# Patient Record
Sex: FEMALE | Race: WHITE | HISPANIC OR LATINO
[De-identification: names, ages, dates, MRNs, and addresses within clinical notes are randomized per-mention and may not be internally consistent; named-entity substitution may affect disease eponyms.]

---

## 2019-05-29 ENCOUNTER — ASOB RESULT (OUTPATIENT)
Age: 29
End: 2019-05-29

## 2019-05-29 ENCOUNTER — APPOINTMENT (OUTPATIENT)
Dept: ANTEPARTUM | Facility: CLINIC | Age: 29
End: 2019-05-29
Payer: SELF-PAY

## 2019-05-29 PROBLEM — Z00.00 ENCOUNTER FOR PREVENTIVE HEALTH EXAMINATION: Status: ACTIVE | Noted: 2019-05-29

## 2019-05-29 PROCEDURE — 76856 US EXAM PELVIC COMPLETE: CPT

## 2019-11-15 ENCOUNTER — ASOB RESULT (OUTPATIENT)
Age: 29
End: 2019-11-15

## 2019-11-15 ENCOUNTER — APPOINTMENT (OUTPATIENT)
Dept: ANTEPARTUM | Facility: CLINIC | Age: 29
End: 2019-11-15
Payer: MEDICAID

## 2019-11-15 PROCEDURE — 76811 OB US DETAILED SNGL FETUS: CPT

## 2019-11-15 PROCEDURE — 76817 TRANSVAGINAL US OBSTETRIC: CPT

## 2019-11-23 ENCOUNTER — APPOINTMENT (OUTPATIENT)
Dept: ANTEPARTUM | Facility: CLINIC | Age: 29
End: 2019-11-23

## 2019-11-23 ENCOUNTER — APPOINTMENT (OUTPATIENT)
Dept: MATERNAL FETAL MEDICINE | Facility: CLINIC | Age: 29
End: 2019-11-23

## 2019-12-27 ENCOUNTER — APPOINTMENT (OUTPATIENT)
Dept: ANTEPARTUM | Facility: CLINIC | Age: 29
End: 2019-12-27
Payer: MEDICAID

## 2019-12-27 ENCOUNTER — ASOB RESULT (OUTPATIENT)
Age: 29
End: 2019-12-27

## 2019-12-27 ENCOUNTER — APPOINTMENT (OUTPATIENT)
Dept: MATERNAL FETAL MEDICINE | Facility: CLINIC | Age: 29
End: 2019-12-27
Payer: MEDICAID

## 2019-12-27 PROCEDURE — 76816 OB US FOLLOW-UP PER FETUS: CPT

## 2019-12-27 PROCEDURE — 99201 OFFICE OUTPATIENT NEW 10 MINUTES: CPT | Mod: TH

## 2020-02-10 ENCOUNTER — APPOINTMENT (OUTPATIENT)
Dept: ANTEPARTUM | Facility: CLINIC | Age: 30
End: 2020-02-10
Payer: MEDICAID

## 2020-02-10 ENCOUNTER — ASOB RESULT (OUTPATIENT)
Age: 30
End: 2020-02-10

## 2020-02-10 PROCEDURE — 76816 OB US FOLLOW-UP PER FETUS: CPT

## 2020-02-10 PROCEDURE — 76819 FETAL BIOPHYS PROFIL W/O NST: CPT

## 2020-03-11 ENCOUNTER — APPOINTMENT (OUTPATIENT)
Dept: ANTEPARTUM | Facility: CLINIC | Age: 30
End: 2020-03-11
Payer: MEDICAID

## 2020-03-11 ENCOUNTER — ASOB RESULT (OUTPATIENT)
Age: 30
End: 2020-03-11

## 2020-03-11 PROCEDURE — 76816 OB US FOLLOW-UP PER FETUS: CPT

## 2020-03-11 PROCEDURE — 76819 FETAL BIOPHYS PROFIL W/O NST: CPT

## 2020-03-28 ENCOUNTER — INPATIENT (INPATIENT)
Facility: HOSPITAL | Age: 30
LOS: 1 days | Discharge: HOME | End: 2020-03-30
Attending: OBSTETRICS & GYNECOLOGY | Admitting: OBSTETRICS & GYNECOLOGY
Payer: COMMERCIAL

## 2020-03-28 VITALS — HEART RATE: 72 BPM | SYSTOLIC BLOOD PRESSURE: 110 MMHG | DIASTOLIC BLOOD PRESSURE: 66 MMHG

## 2020-03-28 DIAGNOSIS — O47.1 FALSE LABOR AT OR AFTER 37 COMPLETED WEEKS OF GESTATION: ICD-10-CM

## 2020-03-28 NOTE — OB RN TRIAGE NOTE - NSHISCREENINGSIGNS_ED_A_ED
reacting to disappointments, criticisms or teasing with extreme and intense anger, blame or a desire for revenge

## 2020-03-29 ENCOUNTER — TRANSCRIPTION ENCOUNTER (OUTPATIENT)
Age: 30
End: 2020-03-29

## 2020-03-29 LAB
APPEARANCE UR: CLEAR — SIGNIFICANT CHANGE UP
BACTERIA # UR AUTO: ABNORMAL
BASOPHILS # BLD AUTO: 0.03 K/UL — SIGNIFICANT CHANGE UP (ref 0–0.2)
BASOPHILS NFR BLD AUTO: 0.5 % — SIGNIFICANT CHANGE UP (ref 0–2)
BILIRUB UR-MCNC: NEGATIVE — SIGNIFICANT CHANGE UP
BLD GP AB SCN SERPL QL: SIGNIFICANT CHANGE UP
COLOR SPEC: YELLOW — SIGNIFICANT CHANGE UP
DIFF PNL FLD: ABNORMAL
EOSINOPHIL # BLD AUTO: 0.03 K/UL — SIGNIFICANT CHANGE UP (ref 0–0.5)
EOSINOPHIL NFR BLD AUTO: 0.5 % — SIGNIFICANT CHANGE UP (ref 0–6)
EPI CELLS # UR: SIGNIFICANT CHANGE UP
GLUCOSE UR QL: NEGATIVE MG/DL — SIGNIFICANT CHANGE UP
HCT VFR BLD CALC: 38.1 % — SIGNIFICANT CHANGE UP (ref 34.5–45)
HGB BLD-MCNC: 12.8 G/DL — SIGNIFICANT CHANGE UP (ref 11.5–15.5)
IMM GRANULOCYTES NFR BLD AUTO: 1.1 % — SIGNIFICANT CHANGE UP (ref 0–1.5)
KETONES UR-MCNC: NEGATIVE — SIGNIFICANT CHANGE UP
LEUKOCYTE ESTERASE UR-ACNC: NEGATIVE — SIGNIFICANT CHANGE UP
LYMPHOCYTES # BLD AUTO: 1.49 K/UL — SIGNIFICANT CHANGE UP (ref 1–3.3)
LYMPHOCYTES # BLD AUTO: 23.5 % — SIGNIFICANT CHANGE UP (ref 13–44)
MCHC RBC-ENTMCNC: 29.5 PG — SIGNIFICANT CHANGE UP (ref 27–34)
MCHC RBC-ENTMCNC: 33.6 GM/DL — SIGNIFICANT CHANGE UP (ref 32–36)
MCV RBC AUTO: 87.8 FL — SIGNIFICANT CHANGE UP (ref 80–100)
MONOCYTES # BLD AUTO: 0.4 K/UL — SIGNIFICANT CHANGE UP (ref 0–0.9)
MONOCYTES NFR BLD AUTO: 6.3 % — SIGNIFICANT CHANGE UP (ref 2–14)
NEUTROPHILS # BLD AUTO: 4.33 K/UL — SIGNIFICANT CHANGE UP (ref 1.8–7.4)
NEUTROPHILS NFR BLD AUTO: 68.1 % — SIGNIFICANT CHANGE UP (ref 43–77)
NITRITE UR-MCNC: NEGATIVE — SIGNIFICANT CHANGE UP
PH UR: 7 — SIGNIFICANT CHANGE UP (ref 5–8)
PLATELET # BLD AUTO: 266 K/UL — SIGNIFICANT CHANGE UP (ref 150–400)
PROT UR-MCNC: NEGATIVE MG/DL — SIGNIFICANT CHANGE UP
RBC # BLD: 4.34 M/UL — SIGNIFICANT CHANGE UP (ref 3.8–5.2)
RBC # FLD: 13.2 % — SIGNIFICANT CHANGE UP (ref 10.3–14.5)
RBC CASTS # UR COMP ASSIST: SIGNIFICANT CHANGE UP /HPF (ref 0–4)
SP GR SPEC: 1 — LOW (ref 1.01–1.02)
UROBILINOGEN FLD QL: NEGATIVE MG/DL — SIGNIFICANT CHANGE UP
WBC # BLD: 6.35 K/UL — SIGNIFICANT CHANGE UP (ref 3.8–10.5)
WBC # FLD AUTO: 6.35 K/UL — SIGNIFICANT CHANGE UP (ref 3.8–10.5)
WBC UR QL: SIGNIFICANT CHANGE UP

## 2020-03-29 RX ORDER — MAGNESIUM HYDROXIDE 400 MG/1
30 TABLET, CHEWABLE ORAL
Refills: 0 | Status: DISCONTINUED | OUTPATIENT
Start: 2020-03-29 | End: 2020-03-30

## 2020-03-29 RX ORDER — KETOROLAC TROMETHAMINE 30 MG/ML
30 SYRINGE (ML) INJECTION ONCE
Refills: 0 | Status: DISCONTINUED | OUTPATIENT
Start: 2020-03-29 | End: 2020-03-29

## 2020-03-29 RX ORDER — PRAMOXINE HYDROCHLORIDE 150 MG/15G
1 AEROSOL, FOAM RECTAL EVERY 4 HOURS
Refills: 0 | Status: DISCONTINUED | OUTPATIENT
Start: 2020-03-29 | End: 2020-03-30

## 2020-03-29 RX ORDER — OXYCODONE HYDROCHLORIDE 5 MG/1
5 TABLET ORAL
Refills: 0 | Status: DISCONTINUED | OUTPATIENT
Start: 2020-03-29 | End: 2020-03-30

## 2020-03-29 RX ORDER — OXYTOCIN 10 UNIT/ML
333.33 VIAL (ML) INJECTION
Qty: 20 | Refills: 0 | Status: DISCONTINUED | OUTPATIENT
Start: 2020-03-29 | End: 2020-03-30

## 2020-03-29 RX ORDER — SIMETHICONE 80 MG/1
80 TABLET, CHEWABLE ORAL EVERY 4 HOURS
Refills: 0 | Status: DISCONTINUED | OUTPATIENT
Start: 2020-03-29 | End: 2020-03-30

## 2020-03-29 RX ORDER — DIBUCAINE 1 %
1 OINTMENT (GRAM) RECTAL EVERY 6 HOURS
Refills: 0 | Status: DISCONTINUED | OUTPATIENT
Start: 2020-03-29 | End: 2020-03-30

## 2020-03-29 RX ORDER — OXYCODONE HYDROCHLORIDE 5 MG/1
5 TABLET ORAL ONCE
Refills: 0 | Status: DISCONTINUED | OUTPATIENT
Start: 2020-03-29 | End: 2020-03-30

## 2020-03-29 RX ORDER — CITRIC ACID/SODIUM CITRATE 300-500 MG
30 SOLUTION, ORAL ORAL ONCE
Refills: 0 | Status: DISCONTINUED | OUTPATIENT
Start: 2020-03-29 | End: 2020-03-29

## 2020-03-29 RX ORDER — HYDROCORTISONE 1 %
1 OINTMENT (GRAM) TOPICAL EVERY 6 HOURS
Refills: 0 | Status: DISCONTINUED | OUTPATIENT
Start: 2020-03-29 | End: 2020-03-30

## 2020-03-29 RX ORDER — LANOLIN
1 OINTMENT (GRAM) TOPICAL EVERY 6 HOURS
Refills: 0 | Status: DISCONTINUED | OUTPATIENT
Start: 2020-03-29 | End: 2020-03-30

## 2020-03-29 RX ORDER — IBUPROFEN 200 MG
1 TABLET ORAL
Qty: 20 | Refills: 0
Start: 2020-03-29 | End: 2020-04-02

## 2020-03-29 RX ORDER — OXYTOCIN 10 UNIT/ML
333.33 VIAL (ML) INJECTION
Qty: 20 | Refills: 0 | Status: COMPLETED | OUTPATIENT
Start: 2020-03-29 | End: 2020-03-29

## 2020-03-29 RX ORDER — DIPHENHYDRAMINE HCL 50 MG
25 CAPSULE ORAL EVERY 6 HOURS
Refills: 0 | Status: DISCONTINUED | OUTPATIENT
Start: 2020-03-29 | End: 2020-03-30

## 2020-03-29 RX ORDER — DOCUSATE SODIUM 100 MG
1 CAPSULE ORAL
Qty: 14 | Refills: 0
Start: 2020-03-29 | End: 2020-04-11

## 2020-03-29 RX ORDER — ACETAMINOPHEN 500 MG
975 TABLET ORAL
Refills: 0 | Status: DISCONTINUED | OUTPATIENT
Start: 2020-03-29 | End: 2020-03-30

## 2020-03-29 RX ORDER — IBUPROFEN 200 MG
600 TABLET ORAL EVERY 6 HOURS
Refills: 0 | Status: COMPLETED | OUTPATIENT
Start: 2020-03-29 | End: 2021-02-25

## 2020-03-29 RX ORDER — SODIUM CHLORIDE 9 MG/ML
1000 INJECTION, SOLUTION INTRAVENOUS
Refills: 0 | Status: DISCONTINUED | OUTPATIENT
Start: 2020-03-29 | End: 2020-03-29

## 2020-03-29 RX ORDER — SODIUM CHLORIDE 9 MG/ML
3 INJECTION INTRAMUSCULAR; INTRAVENOUS; SUBCUTANEOUS EVERY 8 HOURS
Refills: 0 | Status: DISCONTINUED | OUTPATIENT
Start: 2020-03-29 | End: 2020-03-30

## 2020-03-29 RX ORDER — GLYCERIN ADULT
1 SUPPOSITORY, RECTAL RECTAL AT BEDTIME
Refills: 0 | Status: DISCONTINUED | OUTPATIENT
Start: 2020-03-29 | End: 2020-03-30

## 2020-03-29 RX ORDER — AER TRAVELER 0.5 G/1
1 SOLUTION RECTAL; TOPICAL EVERY 4 HOURS
Refills: 0 | Status: DISCONTINUED | OUTPATIENT
Start: 2020-03-29 | End: 2020-03-30

## 2020-03-29 RX ORDER — BENZOCAINE 10 %
1 GEL (GRAM) MUCOUS MEMBRANE EVERY 6 HOURS
Refills: 0 | Status: DISCONTINUED | OUTPATIENT
Start: 2020-03-29 | End: 2020-03-30

## 2020-03-29 RX ORDER — IBUPROFEN 200 MG
600 TABLET ORAL EVERY 6 HOURS
Refills: 0 | Status: DISCONTINUED | OUTPATIENT
Start: 2020-03-29 | End: 2020-03-30

## 2020-03-29 RX ORDER — TETANUS TOXOID, REDUCED DIPHTHERIA TOXOID AND ACELLULAR PERTUSSIS VACCINE, ADSORBED 5; 2.5; 8; 8; 2.5 [IU]/.5ML; [IU]/.5ML; UG/.5ML; UG/.5ML; UG/.5ML
0.5 SUSPENSION INTRAMUSCULAR ONCE
Refills: 0 | Status: DISCONTINUED | OUTPATIENT
Start: 2020-03-29 | End: 2020-03-30

## 2020-03-29 RX ADMIN — Medication 0.2 MILLIGRAM(S): at 10:15

## 2020-03-29 RX ADMIN — Medication 600 MILLIGRAM(S): at 17:40

## 2020-03-29 RX ADMIN — Medication 1 TABLET(S): at 15:21

## 2020-03-29 RX ADMIN — Medication 600 MILLIGRAM(S): at 18:24

## 2020-03-29 RX ADMIN — Medication 975 MILLIGRAM(S): at 15:22

## 2020-03-29 RX ADMIN — Medication 975 MILLIGRAM(S): at 16:20

## 2020-03-29 RX ADMIN — Medication 975 MILLIGRAM(S): at 22:15

## 2020-03-29 RX ADMIN — SODIUM CHLORIDE 3 MILLILITER(S): 9 INJECTION INTRAMUSCULAR; INTRAVENOUS; SUBCUTANEOUS at 21:47

## 2020-03-29 RX ADMIN — Medication 1000 MILLIUNIT(S)/MIN: at 08:04

## 2020-03-29 RX ADMIN — Medication 975 MILLIGRAM(S): at 21:45

## 2020-03-29 RX ADMIN — SODIUM CHLORIDE 125 MILLILITER(S): 9 INJECTION, SOLUTION INTRAVENOUS at 02:05

## 2020-03-29 RX ADMIN — Medication 30 MILLIGRAM(S): at 09:43

## 2020-03-29 RX ADMIN — Medication 30 MILLIGRAM(S): at 09:58

## 2020-03-29 RX ADMIN — SODIUM CHLORIDE 3 MILLILITER(S): 9 INJECTION INTRAMUSCULAR; INTRAVENOUS; SUBCUTANEOUS at 15:12

## 2020-03-29 NOTE — DISCHARGE NOTE OB - MEDICATION SUMMARY - MEDICATIONS TO TAKE
I will START or STAY ON the medications listed below when I get home from the hospital:    ibuprofen 600 mg oral tablet  -- 1 tab(s) by mouth every 6 hours, As Needed -for moderate pain   -- Indication: For     Colace 100 mg oral capsule  -- 1 cap(s) by mouth once a day   -- Medication should be taken with plenty of water.    -- Indication: For

## 2020-03-29 NOTE — OB PROVIDER H&P - ASSESSMENT
Patient is a 28yo  at 40w3d presenting today in labor  - Consent  - Admission labs  - GBS neg  - Will augment if needed with pitocin    D/w Dr. Fuentes

## 2020-03-29 NOTE — DISCHARGE NOTE OB - CARE PROVIDER_API CALL
Alexis Fuentes)  Obstetrics and Gynecology  [address] [place] [address]  Phone: [phone]  Fax: [phone]  Follow Up Time:

## 2020-03-29 NOTE — CHART NOTE - NSCHARTNOTEFT_GEN_A_CORE
30yo  s/p  PPD #0.  Provider called regarding patient's heavy lochia - a few clots were expressed on fundal pressure.   Patient seen and examined at bedside.  Denies headache but endorses some dizziness.   Abd: soft, appropriately tender. Uterine fundus firm at umbilicus.  Minimal lochia evacuated on bimanual exam.    Vital Signs Last 24 Hrs  T(C): 36.8 (29 Mar 2020 08:26), Max: 37.0 (29 Mar 2020 00:31)  T(F): 98.2 (29 Mar 2020 08:26), Max: 98.6 (29 Mar 2020 00:31)  HR: 75 (29 Mar 2020 10:11) (63 - 93)  BP: 100/57 (29 Mar 2020 10:11) (89/54 - 125/76)  RR: 16 (29 Mar 2020 09:41) (15 - 18)  SpO2: 97% (29 Mar 2020 07:38) (87% - 99%)    A/P  30yo  s/p  PPD #0 with heavy post-partum lochia. Chucks and 28 y/o  s/p  PPD #0.    Provider was called regarding patient's heavy lochia - a few clots were expressed on fundal pressure.   Patient was seen and examined at bedside.  Denies headache but endorses some dizziness.     Vital Signs Last 24 Hrs  T(C): 36.8 (29 Mar 2020 08:26), Max: 37.0 (29 Mar 2020 00:31)  T(F): 98.2 (29 Mar 2020 08:26), Max: 98.6 (29 Mar 2020 00:31)  HR: 75 (29 Mar 2020 10:11) (63 - 93)  BP: 100/57 (29 Mar 2020 10:11) (89/54 - 125/76)  RR: 16 (29 Mar 2020 09:41) (15 - 18)  SpO2: 97% (29 Mar 2020 07:38) (87% - 99%)    Gen: NAD. A&Ox3.  Abd: soft, appropriately tender. Uterine fundus firm at umbilicus.   Pelvic: Approximately 150 mL of vaginal blood with blood clots on richie. Minimal lochia evacuated on bimanual exam.    A/P: 28 y/o  s/p  PPD #0 with heavy post-partum lochia ( mL from delivery + 150 mL postpartum).    -Methergine 0.2 mg IM x 1 ordered.  -Will continue to monitor bleeding with pad checks.

## 2020-03-29 NOTE — DISCHARGE NOTE OB - PATIENT PORTAL LINK FT
You can access the FollowMyHealth Patient Portal offered by [facility] by registering at the following website: [email or web address]. By joining [facility]’s FollowMyHealth portal, you will also be able to view your health information using other applications (apps) compatible with our system.

## 2020-03-29 NOTE — OB RN DELIVERY SUMMARY - NS_SEPSISRSKCALC_OBGYN_ALL_OB_FT
EOS calculated successfully. EOS Risk Factor: 0.5/1000 live births ([facility] national incidence); GA=40w4d; Temp=98.6; ROM=3.783; GBS='Negative'; Antibiotics='No antibiotics or any antibiotics < 2 hrs prior to birth'

## 2020-03-29 NOTE — CHART NOTE - NSCHARTNOTEFT_GEN_A_CORE
Patient seen at bedside, resting comfortably, epidural in place  She was checked and found to be 7/80/-2  AROM, thick mec    Vital Signs Last 24 Hrs  T(C): 37 (29 Mar 2020 00:52), Max: 37.0 (29 Mar 2020 00:31)  T(F): 98.6 (29 Mar 2020 00:52), Max: 98.6 (29 Mar 2020 00:31)  HR: 75 (29 Mar 2020 04:13) (63 - 85)  BP: 125/76 (29 Mar 2020 04:16) (89/57 - 125/76)  RR: 16 (29 Mar 2020 02:58) (15 - 16)  SpO2: 98% (29 Mar 2020 04:13) (87% - 99%)    FHT: 130bpm - category I tracing  Floral: Ctxs every 2min    Continue expectant mgmt

## 2020-03-29 NOTE — DISCHARGE NOTE OB - HOSPITAL COURSE
Patient is a 30yo  delivered via spontaneous vaginal delivery. She was transferred to postpartum unit without complications during her stay. Upon discharge she is voiding, tolerating PO, ambulating, and pain is well controlled.

## 2020-03-29 NOTE — OB PROVIDER DELIVERY SUMMARY - NSPROVIDERDELIVERYNOTE_OBGYN_ALL_OB_FT
Procedure: Normal vaginal delivery  Findings: Viable female infant delivered in cephalic presentation at 0801, placenta delivered at 0804. Apgar 9/9. Weight 7lb 9oz.   Laceration: 2nd degree perineal  Repair: 3-0 Chromic SH x3  Procedure: Patient felt rectal pressure and was found to be fully dilated, +2 station. She pushed effectively for 5 minutes. In conjunction with maternal effort, she delivered a viable female infant. Placenta delivered intact. Perineum and vagina were inspected, 2nd laceration present and repaired. Excellent hemostasis obtained. Procedure: Normal spontaneous vaginal delivery  Findings: Viable female infant delivered in cephalic presentation at 08:01, placenta delivered at 08:04. Apgars 9/9. Weight 7 lb 9 oz.   Laceration: 2nd degree perineal  Repair: 3-0 Chromic SH x 3  Procedure: Patient felt rectal pressure and was found to be fully dilated, +2 station. She pushed effectively for 5 minutes. In conjunction with maternal effort, she delivered a viable female infant. Infant's head delivered in RANJAN position atraumatically. No nuchal cord noted. The infant's shoulders and body were guided out of the vagina without difficulty. Delayed cord clamping for 20 seconds was done. Infant was handed off to the neonatologist for skin to skin due to meconium stained amniotic fluid. Cord gases were sent. Placenta was delivered spontaneously and intact. Perineum and vagina were inspected. Patient had a second degree perineal laceration, which was repaired in layers using 3-0 Chromic sutures. Excellent hemostasis. No vaginal or cervical lacerations noted. Rectum was intact. Patient tolerated the delivery and repair well with epidural anesthesia and 1% local lidocaine. No complications.

## 2020-03-29 NOTE — OB NEONATOLOGY/PEDIATRICIAN DELIVERY SUMMARY - NSPEDSNEONOTESA_OBGYN_ALL_OB_FT
Called to LDR # 7 by Moises Fuentes MD to attend normal vaginal birth at 40 4/7 weeks after AROM. Mother is a 30 year old G4 P 2012 blood type O positive, serology NR, HBsAg negative, HIV negative, GBS negative, Rubella immune.  EDC 3/25/2020.  Denies allergies, denies hypertension, denies diabetes, denies Asthma.  Family History: non contributory.  Social History: , denies smoking, denies alcohol abuse, denies illicit drug use.  ROS: unobtainable in .  Labor and Delivery:  AROM at 3/29/2020 @ 0414 hours with thick meconium stained amniotic fluid.  Infant delivered 3/29/2020@ 0801 hours with strong cry, good activity and tone.  Placed on mother's abdomen, dried positioned and suctioned.  Apgar score 9 and 9 at 1 and 5 minutes respectively.  Transferred to regular nursery for management with pediatric hospitalist.  Female.  Bwt: 3420g

## 2020-03-29 NOTE — OB PROVIDER H&P - HISTORY OF PRESENT ILLNESS
Patient is a 28yo  at 40w3d presenting today for ctxs since 6pm. Also endorses loss of fluid and brown discharge at 7pm. She is feeling baby moving normally.    Prenatal course complicated by episode of cough, runny nose, body aches, unsure fever, tested for COVID, FLU, RVP all negative  QuantiFeron positive, CXR normal    Obhx:  - , , FT, uncomplicated, 7lbs  - 2007, , FT, uncomplicated, 4mbr6vb  - 2019, SAB  Medhx: none  Surghx: none  Meds: PNVs  All: NKDA

## 2020-03-29 NOTE — OB PROVIDER H&P - NSHPPHYSICALEXAM_GEN_ALL_CORE
Vital Signs Last 24 Hrs  T(C): 37.0 (29 Mar 2020 00:31), Max: 37.0 (29 Mar 2020 00:31)  T(F): 98.6 (29 Mar 2020 00:31), Max: 98.6 (29 Mar 2020 00:31)  HR: 69 (29 Mar 2020 00:31) (69 - 72)  BP: 112/71 (29 Mar 2020 00:31) (110/66 - 112/71)  RR: 15 (29 Mar 2020 00:31) (15 - 16)    General: Alert and oriented x3, NAD  Abd: Soft, nontender, gravid  SVE: 4/70/-3, intact  SSE: Neg pooling, neg nitrazine, neg amnisure

## 2020-03-30 VITALS
HEART RATE: 71 BPM | DIASTOLIC BLOOD PRESSURE: 76 MMHG | SYSTOLIC BLOOD PRESSURE: 110 MMHG | RESPIRATION RATE: 18 BRPM | TEMPERATURE: 98 F

## 2020-03-30 LAB
HCT VFR BLD CALC: 33.7 % — LOW (ref 34.5–45)
HGB BLD-MCNC: 11 G/DL — LOW (ref 11.5–15.5)
T PALLIDUM AB TITR SER: NEGATIVE — SIGNIFICANT CHANGE UP

## 2020-03-30 PROCEDURE — 86901 BLOOD TYPING SEROLOGIC RH(D): CPT

## 2020-03-30 PROCEDURE — 85027 COMPLETE CBC AUTOMATED: CPT

## 2020-03-30 PROCEDURE — 86780 TREPONEMA PALLIDUM: CPT

## 2020-03-30 PROCEDURE — 81001 URINALYSIS AUTO W/SCOPE: CPT

## 2020-03-30 PROCEDURE — 86900 BLOOD TYPING SEROLOGIC ABO: CPT

## 2020-03-30 PROCEDURE — 36415 COLL VENOUS BLD VENIPUNCTURE: CPT

## 2020-03-30 PROCEDURE — 59025 FETAL NON-STRESS TEST: CPT

## 2020-03-30 PROCEDURE — 59050 FETAL MONITOR W/REPORT: CPT

## 2020-03-30 PROCEDURE — 85014 HEMATOCRIT: CPT

## 2020-03-30 PROCEDURE — 86850 RBC ANTIBODY SCREEN: CPT

## 2020-03-30 PROCEDURE — G0463: CPT

## 2020-03-30 PROCEDURE — 85018 HEMOGLOBIN: CPT

## 2020-03-30 RX ADMIN — Medication 600 MILLIGRAM(S): at 12:15

## 2020-03-30 RX ADMIN — Medication 1 TABLET(S): at 12:16

## 2020-03-30 RX ADMIN — Medication 600 MILLIGRAM(S): at 05:21

## 2020-03-30 RX ADMIN — Medication 975 MILLIGRAM(S): at 09:19

## 2020-03-30 RX ADMIN — SODIUM CHLORIDE 3 MILLILITER(S): 9 INJECTION INTRAMUSCULAR; INTRAVENOUS; SUBCUTANEOUS at 05:22

## 2020-03-30 NOTE — PROGRESS NOTE ADULT - SUBJECTIVE AND OBJECTIVE BOX
Name: HERBIE COLBERT  MRN: [id number]  Date Admitted: 20  Location: [facility] 2EST 2002 ([facility] 2EST)  Attending: Alexis Fuentes Khaldun    All: No Known Allergies    Post Partum: Vaginal Delivery Progress Note    HERBIE COLBERT is a 29y  s/p  PPD #1 of a viable female infant.     SUBJECTIVE:  No acute events overnight. Pain is well controlled with PRN pain medication. No problems with ambulating, voiding, or PO intake. Has had flatus but no BM. Denies N/V. Patient is having normal lochia which is decreasing.    She is breastfeeding and the baby is latching on.     OBJECTIVE:  Physical exam:  General: AOx3, NAD.  Abdomen: Soft, appropriately tender to palpitation, firm uterine fundus at umbilicus.  Ext: No DVT signs, warm extremities.    Vital Signs Last 24 Hrs  T(C): 36.8 (29 Mar 2020 21:14), Max: 36.9 (29 Mar 2020 05:53)  T(F): 98.3 (29 Mar 2020 21:14), Max: 98.42 (29 Mar 2020 05:53)  HR: 56 (29 Mar 2020 21:14) (56 - 93)  BP: 98/62 (29 Mar 2020 21:14) (89/54 - 136/67)  RR: 18 (29 Mar 2020 21:14) (16 - 18)  SpO2: 99% (29 Mar 2020 21:14) (92% - 99%)    LABS:                        12.8   6.35  )-----------( 266      ( 29 Mar 2020 01:06 )             38.1

## 2020-03-31 PROBLEM — Z78.9 OTHER SPECIFIED HEALTH STATUS: Chronic | Status: ACTIVE | Noted: 2020-03-29

## 2020-08-07 NOTE — OB RN TRIAGE NOTE - NS_SISCREENINGSR_GEN_ALL_ED
LOC: The patient is awake, alert and aware of environment with an appropriate affect, the patient is oriented x 3 and speaking appropriately.  APPEARANCE: Patient resting comfortably and in no acute distress, patient is clean and well groomed, patient's clothing properly fastened.  SKIN: The skin is warm and diaphoretic, color consistent with ethnicity, patient has normal skin turgor and moist mucus membranes, skin intact, no breakdown or brusing noted.  MUSKULOSKELETAL: Patient moving all extremities well, no obvious swelling or deformities noted.  RESPIRATORY: Airway is open and patent, respirations are spontaneous, patient has a normal effort and rate, no accessory muscle use noted.  CARDIAC: Patient has a rate of 185 on arrival and AFIB rhythm, no peripheral edema noted, capillary refill < 3 seconds.   ABDOMEN: Soft and non tender to palpation, no distention noted.  NEUROLOGIC: PERRL, 3mm bilaterally, eyes open spontaneously, behavior appropriate to situation, follows commands, facial expression symmetrical, bilateral hand grasp equal and even, purposeful motor response noted, normal sensation in all extremities when touched with a finger.     Margarita Mohan RN  08/07/20 [id number]     Negative

## 2021-08-27 ENCOUNTER — OUTPATIENT (OUTPATIENT)
Dept: OUTPATIENT SERVICES | Facility: HOSPITAL | Age: 31
LOS: 1 days | End: 2021-08-27
Payer: COMMERCIAL

## 2021-08-27 DIAGNOSIS — Z20.828 CONTACT WITH AND (SUSPECTED) EXPOSURE TO OTHER VIRAL COMMUNICABLE DISEASES: ICD-10-CM

## 2021-08-27 PROCEDURE — U0005: CPT

## 2021-08-27 PROCEDURE — U0003: CPT

## 2021-08-28 LAB — SARS-COV-2 RNA SPEC QL NAA+PROBE: SIGNIFICANT CHANGE UP

## 2022-02-01 NOTE — OB NEONATOLOGY/PEDIATRICIAN DELIVERY SUMMARY - BABY A: APGAR 5 MIN SCORE, DELIVERY
Detail Level: Detailed Quality 226: Preventive Care And Screening: Tobacco Use: Screening And Cessation Intervention: Patient screened for tobacco use, is a smoker AND received Cessation Counseling Quality 130: Documentation Of Current Medications In The Medical Record: Current Medications Documented Quality 431: Preventive Care And Screening: Unhealthy Alcohol Use - Screening: Patient not identified as an unhealthy alcohol user when screened for unhealthy alcohol use using a systematic screening method 9

## 2022-12-16 ENCOUNTER — APPOINTMENT (OUTPATIENT)
Dept: OBGYN | Facility: CLINIC | Age: 32
End: 2022-12-16

## 2022-12-16 VITALS
HEIGHT: 60 IN | BODY MASS INDEX: 25.91 KG/M2 | DIASTOLIC BLOOD PRESSURE: 64 MMHG | WEIGHT: 132 LBS | SYSTOLIC BLOOD PRESSURE: 98 MMHG

## 2022-12-16 DIAGNOSIS — N89.8 OTHER SPECIFIED NONINFLAMMATORY DISORDERS OF VAGINA: ICD-10-CM

## 2022-12-16 DIAGNOSIS — Z78.9 OTHER SPECIFIED HEALTH STATUS: ICD-10-CM

## 2022-12-16 DIAGNOSIS — Z01.411 ENCOUNTER FOR GYNECOLOGICAL EXAMINATION (GENERAL) (ROUTINE) WITH ABNORMAL FINDINGS: ICD-10-CM

## 2022-12-16 PROCEDURE — 99385 PREV VISIT NEW AGE 18-39: CPT

## 2022-12-16 RX ORDER — FLUCONAZOLE 150 MG/1
150 TABLET ORAL
Qty: 2 | Refills: 3 | Status: ACTIVE | COMMUNITY
Start: 2022-12-16 | End: 1900-01-01

## 2022-12-16 NOTE — PHYSICAL EXAM
[Chaperone Present] : A chaperone was present in the examining room during all aspects of the physical examination [Appropriately responsive] : appropriately responsive [Alert] : alert [No Acute Distress] : no acute distress [Soft] : soft [Non-tender] : non-tender [Non-distended] : non-distended [No HSM] : No HSM [No Lesions] : no lesions [No Mass] : no mass [Oriented x3] : oriented x3 [Examination Of The Breasts] : a normal appearance [No Masses] : no breast masses were palpable [Labia Majora] : normal [Labia Minora] : normal [Normal] : normal [Uterine Adnexae] : normal [Old Blood] : old blood was present in the vagina

## 2022-12-16 NOTE — PLAN
[FreeTextEntry1] : Encounter for GYN exam \par \par - PAP obtained \par - STD testing offered and declined \par \par Vaginal discharge \par \par - Affirm obtained \par - RX sent to pharmacy \par \par Will call patient with any abnormal results \par All questions and concerns addressed during encounter. Pt. agreed to plan of care. \par F/U in 1 year or PRN

## 2022-12-16 NOTE — HISTORY OF PRESENT ILLNESS
[N] : Patient does not use contraception [Y] : Patient is sexually active [Frequency: Q ___ days] : menstrual periods occur approximately every [unfilled] days [Menarche Age: ____] : age at menarche was [unfilled] [FreeTextEntry1] : 32 year old female presents for annual examination. Pt. is sexually active uses nothing for contraception. Valerie states she tried OCP, Nexplanon and Depo with adverse reactions. Pt. states her menstrual cycle comes every 28-30 days with light to moderate bleeding for 5 days. She currently complains of vaginal itching and white like discharge.  [PGHxTotal] : 4 [[facility]] : 3 [PGHxPremature] : 0 [PGHxAbortions] : 1 [[facility] Centeriving] : 3 [PGHxABInduced] : 0 [PGHxABSpont] : 1 [PGHxEctopic] : 0 [PGHxMultBirths] : 0

## 2022-12-16 NOTE — PROCEDURE
[Cervical Pap Smear] : cervical Pap smear [Tolerated Well] : the patient tolerated the procedure well [No Complications] : there were no complications [Affirm (Triple Culture)] : Affirm (triple culture)

## 2022-12-19 LAB
CANDIDA VAG CYTO: DETECTED
G VAGINALIS+PREV SP MTYP VAG QL MICRO: DETECTED
HPV HIGH+LOW RISK DNA PNL CVX: NOT DETECTED
T VAGINALIS VAG QL WET PREP: NOT DETECTED

## 2022-12-19 RX ORDER — METRONIDAZOLE 500 MG/1
500 TABLET ORAL TWICE DAILY
Qty: 14 | Refills: 0 | Status: ACTIVE | COMMUNITY
Start: 2022-12-19 | End: 1900-01-01

## 2023-01-03 LAB — CYTOLOGY CVX/VAG DOC THIN PREP: NORMAL

## 2023-10-25 NOTE — OB PROVIDER H&P - NSLASTLIVEBIRTH_OBGYN_ALL_OB_DT
Render Post-Care Instructions In Note?: no Detail Level: Detailed Show Aperture Variable?: Yes Duration Of Freeze Thaw-Cycle (Seconds): 0 Consent: The patient's consent was obtained including but not limited to risks of crusting, scabbing, blistering, scarring, darker or lighter pigmentary change, recurrence, incomplete removal and infection. Post-Care Instructions: I reviewed with the patient in detail post-care instructions. Patient is to wear sunprotection, and avoid picking at any of the treated lesions. Pt may apply Vaseline to crusted or scabbing areas. 25-Apr-2007